# Patient Record
Sex: MALE | Race: WHITE | NOT HISPANIC OR LATINO | Employment: FULL TIME | URBAN - METROPOLITAN AREA
[De-identification: names, ages, dates, MRNs, and addresses within clinical notes are randomized per-mention and may not be internally consistent; named-entity substitution may affect disease eponyms.]

---

## 2022-03-11 ENCOUNTER — OFFICE VISIT (OUTPATIENT)
Dept: FAMILY MEDICINE CLINIC | Facility: CLINIC | Age: 32
End: 2022-03-11

## 2022-03-11 VITALS
TEMPERATURE: 97.7 F | OXYGEN SATURATION: 99 % | BODY MASS INDEX: 18.48 KG/M2 | DIASTOLIC BLOOD PRESSURE: 76 MMHG | SYSTOLIC BLOOD PRESSURE: 104 MMHG | HEIGHT: 74 IN | HEART RATE: 94 BPM | WEIGHT: 144 LBS

## 2022-03-11 DIAGNOSIS — Z00.00 ENCOUNTER FOR MEDICAL EXAMINATION TO ESTABLISH CARE: ICD-10-CM

## 2022-03-11 DIAGNOSIS — N45.1 ACUTE EPIDIDYMITIS: Primary | ICD-10-CM

## 2022-03-11 LAB
BILIRUB BLD-MCNC: NEGATIVE MG/DL
CLARITY, POC: CLEAR
COLOR UR: ABNORMAL
EXPIRATION DATE: ABNORMAL
GLUCOSE UR STRIP-MCNC: NEGATIVE MG/DL
KETONES UR QL: ABNORMAL
LEUKOCYTE EST, POC: NEGATIVE
Lab: ABNORMAL
NITRITE UR-MCNC: NEGATIVE MG/ML
PH UR: 6 [PH] (ref 5–8)
PROT UR STRIP-MCNC: NEGATIVE MG/DL
RBC # UR STRIP: NEGATIVE /UL
SP GR UR: 1.02 (ref 1–1.03)
UROBILINOGEN UR QL: NORMAL

## 2022-03-11 PROCEDURE — 81003 URINALYSIS AUTO W/O SCOPE: CPT | Performed by: STUDENT IN AN ORGANIZED HEALTH CARE EDUCATION/TRAINING PROGRAM

## 2022-03-11 PROCEDURE — 99203 OFFICE O/P NEW LOW 30 MIN: CPT | Performed by: STUDENT IN AN ORGANIZED HEALTH CARE EDUCATION/TRAINING PROGRAM

## 2022-03-11 RX ORDER — IBUPROFEN 200 MG
200 TABLET ORAL EVERY 6 HOURS PRN
COMMUNITY

## 2022-03-11 RX ORDER — SULFAMETHOXAZOLE AND TRIMETHOPRIM 800; 160 MG/1; MG/1
1 TABLET ORAL 2 TIMES DAILY
Qty: 14 TABLET | Refills: 0 | Status: SHIPPED | OUTPATIENT
Start: 2022-03-11 | End: 2022-04-01

## 2022-03-11 RX ORDER — ACETAMINOPHEN 325 MG/1
650 TABLET ORAL EVERY 6 HOURS PRN
COMMUNITY

## 2022-03-11 NOTE — PROGRESS NOTES
Chief Complaint  Pt presented to clinic with   Chief Complaint   Patient presents with   • Groin Pain     Pt stated the pain started a wk ago .           History of Present Illness  Mr. Axel Carranza 31-year-old male who presented to the clinic for the first time with chief complaint of groin pain.  Patient started on his symptoms started a week ago and pain got worse especially when he sits on a chair or in a lying down position.  Denies having any burning urination or increased frequency.  Review of Systems   Constitutional: Negative for appetite change, fatigue and unexpected weight change.   HENT: Negative for congestion, rhinorrhea, sore throat, trouble swallowing and voice change.    Respiratory: Negative for chest tightness, shortness of breath and wheezing.    Cardiovascular: Negative for chest pain and palpitations.   Gastrointestinal: Negative for abdominal distention, abdominal pain, diarrhea, nausea and vomiting.   Genitourinary: Positive for flank pain, scrotal swelling and testicular pain. Negative for decreased urine volume.   Musculoskeletal: Negative for arthralgias and myalgias.   Skin: Negative for wound.   Neurological: Negative for tremors, weakness and headaches.   Hematological: Negative for adenopathy.   Psychiatric/Behavioral: Negative for dysphoric mood and sleep disturbance.       PMH:   Outpatient Medications Prior to Visit   Medication Sig Dispense Refill   • acetaminophen (TYLENOL) 325 MG tablet Take 650 mg by mouth Every 6 (Six) Hours As Needed for Mild Pain .     • CREATINE PO Take  by mouth.     • ibuprofen (ADVIL,MOTRIN) 200 MG tablet Take 200 mg by mouth Every 6 (Six) Hours As Needed for Mild Pain .       No facility-administered medications prior to visit.      No Known Allergies  History reviewed. No pertinent surgical history.  family history includes Diabetes in his maternal grandfather; Hyperlipidemia in his maternal grandfather; Stroke in his maternal grandmother.    "reports that he has quit smoking. He has never used smokeless tobacco. He reports current alcohol use. He reports that he does not use drugs.     /76   Pulse 94   Temp 97.7 °F (36.5 °C)   Ht 188 cm (74\")   Wt 65.3 kg (144 lb)   SpO2 99%   BMI 18.49 kg/m²   Physical Exam  HENT:      Head: Normocephalic and atraumatic.      Mouth/Throat:      Mouth: Mucous membranes are moist.      Pharynx: Oropharynx is clear.   Eyes:      Extraocular Movements: Extraocular movements intact.      Conjunctiva/sclera: Conjunctivae normal.      Pupils: Pupils are equal, round, and reactive to light.   Cardiovascular:      Rate and Rhythm: Normal rate and regular rhythm.   Pulmonary:      Effort: Pulmonary effort is normal.      Breath sounds: Normal breath sounds.   Abdominal:      General: Bowel sounds are normal.      Palpations: Abdomen is soft.   Musculoskeletal:         General: Normal range of motion.      Cervical back: Neck supple.   Skin:     General: Skin is warm.      Capillary Refill: Capillary refill takes less than 2 seconds.   Neurological:      General: No focal deficit present.      Mental Status: He is alert and oriented to person, place, and time. Mental status is at baseline.   Psychiatric:         Mood and Affect: Mood normal.          Diagnoses and all orders for this visit:    1. Acute epididymitis (Primary)  Comments:  Prescribed Bactrim for 7 days.  Advised patient to call if symptoms still persist after 7 days  Orders:  -     sulfamethoxazole-trimethoprim (Bactrim DS) 800-160 MG per tablet; Take 1 tablet by mouth 2 (Two) Times a Day.  Dispense: 14 tablet; Refill: 0  -     POC Urinalysis Dipstick, Automated    2. Encounter for medical examination to establish care  Comments:  basic lab work has been ordered, will follow up once patient will do labs.  Orders:  -     CBC Auto Differential  -     Lipid Panel With / Chol / HDL Ratio  -     TSH  -     Vitamin D 25 Hydroxy  -     Comprehensive Metabolic " Panel  -     Hepatitis C Antibody        Needs yearly Flu vaccine  Dental visit and exam every year  Seat Belt always when driving or riding cars  Safe sex life to avoid STD  Healthy heart diet  Exercise 3 times a week    Follow Up  Will follow up once labs done.

## 2022-03-29 DIAGNOSIS — N45.1 ACUTE EPIDIDYMITIS: ICD-10-CM

## 2022-03-29 RX ORDER — SULFAMETHOXAZOLE AND TRIMETHOPRIM 800; 160 MG/1; MG/1
1 TABLET ORAL 2 TIMES DAILY
Qty: 14 TABLET | Refills: 0 | Status: CANCELLED | OUTPATIENT
Start: 2022-03-29

## 2022-03-29 NOTE — TELEPHONE ENCOUNTER
Caller: Axel Carranza    Relationship: Self    Best call back number: [phone]    What was the call regarding: PATIENT CALLED TO CHECK ON STATUS OF REQUEST. PLEASE ADVISE IF HE NEEDS AN APPT FIRST.    Do you require a callback: YES

## 2022-03-29 NOTE — TELEPHONE ENCOUNTER
Caller: Axel Carranza    Relationship: Self    Best call back number: [phone]    Requested Prescriptions:   Requested Prescriptions     Pending Prescriptions Disp Refills   • sulfamethoxazole-trimethoprim (Bactrim DS) 800-160 MG per tablet 14 tablet 0     Sig: Take 1 tablet by mouth 2 (Two) Times a Day.        Pharmacy where request should be sent: [address] [phone] [facility] [phone]     Additional details provided by patient: PATIENT STATES THAT HE FINISHED IT OUT 2 WEEKS AGO. PATIENT STATES THAT HIS SYMPTOMS ARE COMING BACK-PAIN IN GROIN AND WOULD LIKE A REFILL OF THIS MEDICATION.      Does the patient have less than a 3 day supply:  [x] Yes  [] No    Kun Escobedo Rep   03/29/22 09:06 EDT

## 2022-04-01 ENCOUNTER — OFFICE VISIT (OUTPATIENT)
Dept: FAMILY MEDICINE CLINIC | Facility: CLINIC | Age: 32
End: 2022-04-01

## 2022-04-01 VITALS
WEIGHT: 151.4 LBS | RESPIRATION RATE: 18 BRPM | OXYGEN SATURATION: 100 % | HEART RATE: 78 BPM | HEIGHT: 74 IN | DIASTOLIC BLOOD PRESSURE: 68 MMHG | BODY MASS INDEX: 19.43 KG/M2 | TEMPERATURE: 97.5 F | SYSTOLIC BLOOD PRESSURE: 110 MMHG

## 2022-04-01 DIAGNOSIS — N45.1 ACUTE EPIDIDYMITIS: Primary | ICD-10-CM

## 2022-04-01 PROCEDURE — 99213 OFFICE O/P EST LOW 20 MIN: CPT | Performed by: STUDENT IN AN ORGANIZED HEALTH CARE EDUCATION/TRAINING PROGRAM

## 2022-04-01 RX ORDER — LEVOFLOXACIN 500 MG/1
500 TABLET, FILM COATED ORAL DAILY
Qty: 10 TABLET | Refills: 0 | Status: SHIPPED | OUTPATIENT
Start: 2022-04-01 | End: 2022-04-12

## 2022-04-01 NOTE — PROGRESS NOTES
"Chief Complaint  Groin Pain (PT HERE FOR FOLLOW UP ON GROIN PAIN- HE IS STILL HAVING THE SAME SYMPTOMS)    Subjective          Axel Carranza presents to [facility]  Follow-up on his groin pain.  Few weeks back patient presented with left testicular pain and was diagnosed with acute epididymitis and was treated with Bactrim.  Patient reported his symptoms actually got disappeared after finishing antibiotics but came back after a week.  Patient reported he is not sexually active since almost a year.  Denies having any urinary complaints such as burning urination or increased frequency.  Review of system is negative for fever, headache, chest pain, shortness of breath, palpitation, nausea, vomiting, any recent change in bowel habits.        Objective   Vital Signs:   /68   Pulse 78   Temp 97.5 °F (36.4 °C) (Oral)   Resp 18   Ht 188 cm (74.02\")   Wt 68.7 kg (151 lb 6.4 oz)   SpO2 100%   BMI 19.43 kg/m²     BMI is within normal parameters. No follow-up required.      Physical Exam  HENT:      Head: Normocephalic and atraumatic.      Mouth/Throat:      Mouth: Mucous membranes are moist.      Pharynx: Oropharynx is clear.   Eyes:      Extraocular Movements: Extraocular movements intact.      Conjunctiva/sclera: Conjunctivae normal.      Pupils: Pupils are equal, round, and reactive to light.   Cardiovascular:      Rate and Rhythm: Normal rate and regular rhythm.   Pulmonary:      Effort: Pulmonary effort is normal.      Breath sounds: Normal breath sounds.   Abdominal:      General: Bowel sounds are normal.      Palpations: Abdomen is soft.   Genitourinary:     Comments: Patient reported mild tenderness on palpation of left testicle  Musculoskeletal:         General: Normal range of motion.      Cervical back: Neck supple.   Skin:     General: Skin is warm.      Capillary Refill: Capillary refill takes less than 2 seconds.   Neurological:      General: No focal deficit present. "      Mental Status: He is alert and oriented to person, place, and time. Mental status is at baseline.   Psychiatric:         Mood and Affect: Mood normal.        Result Review :     UA    Urinalysis 3/11/22 4/12/22 4/12/22     0913 0913   Ketones, UA Trace (A)     Blood, UA  Negative    Leukocytes, UA Negative Negative    Nitrite, UA  Negative    RBC, UA   None seen   Bacteria, UA   None seen   (A) Abnormal value                      Assessment and Plan    Diagnoses and all orders for this visit:    1. Acute epididymitis (Primary)  Comments:  Prescribed Levaquin for 10 days, RTC if symptoms persist or worsen  Orders:  -     Discontinue: levoFLOXacin (Levaquin) 500 MG tablet; Take 1 tablet by mouth Daily.  Dispense: 10 tablet; Refill: 0    Plan is if symptom recurs or persist to send patient to a urologist.    Follow Up   Return if symptoms worsen or fail to improve.  Patient was given instructions and counseling regarding his condition or for health maintenance advice. Please see specific information pulled into the AVS if appropriate.

## 2022-04-09 LAB
25(OH)D3+25(OH)D2 SERPL-MCNC: 24.3 NG/ML (ref 30–100)
ALBUMIN SERPL-MCNC: 4.6 G/DL (ref 4–5)
ALBUMIN/GLOB SERPL: 1.9 {RATIO} (ref 1.2–2.2)
ALP SERPL-CCNC: 58 IU/L (ref 44–121)
ALT SERPL-CCNC: 33 IU/L (ref 0–44)
AST SERPL-CCNC: 33 IU/L (ref 0–40)
BASOPHILS # BLD AUTO: 0 X10E3/UL (ref 0–0.2)
BASOPHILS NFR BLD AUTO: 1 %
BILIRUB SERPL-MCNC: 0.5 MG/DL (ref 0–1.2)
BUN SERPL-MCNC: 20 MG/DL (ref 6–20)
BUN/CREAT SERPL: 17 (ref 9–20)
CALCIUM SERPL-MCNC: 9.5 MG/DL (ref 8.7–10.2)
CHLORIDE SERPL-SCNC: 103 MMOL/L (ref 96–106)
CHOLEST SERPL-MCNC: 171 MG/DL (ref 100–199)
CHOLEST/HDLC SERPL: 2.3 RATIO (ref 0–5)
CO2 SERPL-SCNC: 22 MMOL/L (ref 20–29)
CREAT SERPL-MCNC: 1.17 MG/DL (ref 0.76–1.27)
EGFRCR SERPLBLD CKD-EPI 2021: 85 ML/MIN/1.73
EOSINOPHIL # BLD AUTO: 0.1 X10E3/UL (ref 0–0.4)
EOSINOPHIL NFR BLD AUTO: 2 %
ERYTHROCYTE [DISTWIDTH] IN BLOOD BY AUTOMATED COUNT: 12.8 % (ref 11.6–15.4)
GLOBULIN SER CALC-MCNC: 2.4 G/DL (ref 1.5–4.5)
GLUCOSE SERPL-MCNC: 90 MG/DL (ref 65–99)
HCT VFR BLD AUTO: 45.6 % (ref 37.5–51)
HCV AB S/CO SERPL IA: <0.1 S/CO RATIO (ref 0–0.9)
HDLC SERPL-MCNC: 73 MG/DL
HGB BLD-MCNC: 15.4 G/DL (ref 13–17.7)
IMM GRANULOCYTES # BLD AUTO: 0 X10E3/UL (ref 0–0.1)
IMM GRANULOCYTES NFR BLD AUTO: 0 %
LDLC SERPL CALC-MCNC: 88 MG/DL (ref 0–99)
LYMPHOCYTES # BLD AUTO: 1.6 X10E3/UL (ref 0.7–3.1)
LYMPHOCYTES NFR BLD AUTO: 31 %
MCH RBC QN AUTO: 29.5 PG (ref 26.6–33)
MCHC RBC AUTO-ENTMCNC: 33.8 G/DL (ref 31.5–35.7)
MCV RBC AUTO: 87 FL (ref 79–97)
MONOCYTES # BLD AUTO: 0.4 X10E3/UL (ref 0.1–0.9)
MONOCYTES NFR BLD AUTO: 9 %
NEUTROPHILS # BLD AUTO: 2.9 X10E3/UL (ref 1.4–7)
NEUTROPHILS NFR BLD AUTO: 57 %
PLATELET # BLD AUTO: 224 X10E3/UL (ref 150–450)
POTASSIUM SERPL-SCNC: 4.8 MMOL/L (ref 3.5–5.2)
PROT SERPL-MCNC: 7 G/DL (ref 6–8.5)
RBC # BLD AUTO: 5.22 X10E6/UL (ref 4.14–5.8)
SODIUM SERPL-SCNC: 141 MMOL/L (ref 134–144)
TRIGL SERPL-MCNC: 47 MG/DL (ref 0–149)
TSH SERPL DL<=0.005 MIU/L-ACNC: 1.04 UIU/ML (ref 0.45–4.5)
VLDLC SERPL CALC-MCNC: 10 MG/DL (ref 5–40)
WBC # BLD AUTO: 5.1 X10E3/UL (ref 3.4–10.8)

## 2022-04-12 ENCOUNTER — OFFICE VISIT (OUTPATIENT)
Dept: FAMILY MEDICINE CLINIC | Facility: CLINIC | Age: 32
End: 2022-04-12

## 2022-04-12 VITALS
SYSTOLIC BLOOD PRESSURE: 108 MMHG | HEART RATE: 78 BPM | HEIGHT: 74 IN | DIASTOLIC BLOOD PRESSURE: 68 MMHG | OXYGEN SATURATION: 98 % | RESPIRATION RATE: 18 BRPM | TEMPERATURE: 97.3 F | WEIGHT: 152 LBS | BODY MASS INDEX: 19.51 KG/M2

## 2022-04-12 DIAGNOSIS — N45.1 ACUTE EPIDIDYMITIS: Primary | ICD-10-CM

## 2022-04-12 DIAGNOSIS — E55.9 VITAMIN D DEFICIENCY: ICD-10-CM

## 2022-04-12 PROCEDURE — 99213 OFFICE O/P EST LOW 20 MIN: CPT | Performed by: STUDENT IN AN ORGANIZED HEALTH CARE EDUCATION/TRAINING PROGRAM

## 2022-04-12 NOTE — PROGRESS NOTES
"Chief Complaint  Results (PT HERE FOR FOLLOW UP )    Subjective          Axel Carranza presents to [facility]  Labs follow-up.  Patient reported that his pain in left testis has been improved a lot but still he has some soreness on and off.  Patient has been treated for epididymitis with 2 course of antibiotics first with Bactrim and then with fluoroquinolone.  Denies having any burning urination or increased frequency just having discomfort at the posterior side of the left testis.  Review of system is negative for fever, headache, chest pain, shortness of breath, palpitation, nausea, vomiting, any recent change in bladder habits.  Also mentioned about left knee pain at the posterior side of the knee joint especially when he flexes knee joint.  Pain is for more than a year.      Objective   Vital Signs:   /68   Pulse 78   Temp 97.3 °F (36.3 °C) (Oral)   Resp 18   Ht 188 cm (74.02\")   Wt 68.9 kg (152 lb)   SpO2 98%   BMI 19.51 kg/m²     BMI is within normal parameters. No follow-up required.      Physical Exam  HENT:      Head: Normocephalic and atraumatic.      Mouth/Throat:      Mouth: Mucous membranes are moist.      Pharynx: Oropharynx is clear.   Eyes:      Extraocular Movements: Extraocular movements intact.      Conjunctiva/sclera: Conjunctivae normal.      Pupils: Pupils are equal, round, and reactive to light.   Cardiovascular:      Rate and Rhythm: Normal rate and regular rhythm.   Pulmonary:      Effort: Pulmonary effort is normal.      Breath sounds: Normal breath sounds.   Abdominal:      General: Bowel sounds are normal.      Palpations: Abdomen is soft.   Musculoskeletal:         General: Normal range of motion.      Cervical back: Neck supple.   Skin:     General: Skin is warm.      Capillary Refill: Capillary refill takes less than 2 seconds.   Neurological:      General: No focal deficit present.      Mental Status: He is alert and oriented to person, " place, and time. Mental status is at baseline.   Psychiatric:         Mood and Affect: Mood normal.        Result Review :     CMP    CMP 4/8/22   Glucose 90   BUN 20   Creatinine 1.17   Sodium 141   Potassium 4.8   Chloride 103   Calcium 9.5   Total Protein 7.0   Albumin 4.6   Globulin 2.4   Total Bilirubin 0.5   Alkaline Phosphatase 58   AST (SGOT) 33   ALT (SGPT) 33           CBC    CBC 4/8/22   WBC 5.1   RBC 5.22   Hemoglobin 15.4   Hematocrit 45.6   MCV 87   MCH 29.5   MCHC 33.8   RDW 12.8   Platelets 224           Lipid Panel    Lipid Panel 4/8/22   Total Cholesterol 171   Triglycerides 47   HDL Cholesterol 73   VLDL Cholesterol 10   LDL Cholesterol  88           TSH    TSH 4/8/22   TSH 1.040                     Assessment and Plan    Diagnoses and all orders for this visit:    1. Acute epididymitis (Primary)  Comments:  Has finished Levaquin 10 days antibiotic course, still complaining of on and off soreness, urine analysis, urine culture, chlamydia Neisseria testing ordered  Orders:  -     Urinalysis With Microscopic - Urine, Clean Catch  -     Urine Culture - Urine, Urine, Clean Catch  -     Chlamydia trachomatis, Neisseria gonorrhoeae, PCR - Urine, Urine, Clean Catch  -     CBC Auto Differential; Future  -     Lipid Panel With / Chol / HDL Ratio; Future  -     TSH; Future  -     Vitamin D 25 Hydroxy; Future  -     Comprehensive Metabolic Panel; Future    2. Vitamin D deficiency  Comments:  Recommend to take over-the-counter vitamin D supplements and will check vitamin D level in next year  Orders:  -     CBC Auto Differential; Future  -     Lipid Panel With / Chol / HDL Ratio; Future  -     TSH; Future  -     Vitamin D 25 Hydroxy; Future  -     Comprehensive Metabolic Panel; Future        Follow Up   Return in about 1 year (around 4/12/2023).  Patient was given instructions and counseling regarding his condition or for health maintenance advice. Please see specific information pulled into the AVS if  appropriate.

## 2022-04-14 LAB
APPEARANCE UR: CLEAR
BACTERIA #/AREA URNS HPF: NORMAL /[HPF]
BACTERIA UR CULT: NO GROWTH
BACTERIA UR CULT: NORMAL
BILIRUB UR QL STRIP: NEGATIVE
C TRACH RRNA SPEC QL NAA+PROBE: NEGATIVE
CASTS URNS QL MICRO: NORMAL /LPF
COLOR UR: YELLOW
EPI CELLS #/AREA URNS HPF: NORMAL /HPF (ref 0–10)
GLUCOSE UR QL STRIP: NEGATIVE
HGB UR QL STRIP: NEGATIVE
KETONES UR QL STRIP: NEGATIVE
LEUKOCYTE ESTERASE UR QL STRIP: NEGATIVE
MICRO URNS: NORMAL
MICRO URNS: NORMAL
N GONORRHOEA RRNA SPEC QL NAA+PROBE: NEGATIVE
NITRITE UR QL STRIP: NEGATIVE
PH UR STRIP: 6 [PH] (ref 5–7.5)
PROT UR QL STRIP: NEGATIVE
RBC #/AREA URNS HPF: NORMAL /HPF (ref 0–2)
SP GR UR STRIP: 1.02 (ref 1–1.03)
UROBILINOGEN UR STRIP-MCNC: 0.2 MG/DL (ref 0.2–1)
WBC #/AREA URNS HPF: NORMAL /HPF (ref 0–5)

## 2022-04-15 DIAGNOSIS — N45.1 ACUTE EPIDIDYMITIS: Primary | ICD-10-CM

## 2022-04-15 DIAGNOSIS — N50.812 TESTICULAR PAIN, LEFT: ICD-10-CM

## 2022-04-15 RX ORDER — SULFAMETHOXAZOLE AND TRIMETHOPRIM 800; 160 MG/1; MG/1
1 TABLET ORAL 2 TIMES DAILY
Qty: 28 TABLET | Refills: 0 | Status: SHIPPED | OUTPATIENT
Start: 2022-04-15